# Patient Record
Sex: FEMALE | Race: WHITE | NOT HISPANIC OR LATINO | URBAN - METROPOLITAN AREA
[De-identification: names, ages, dates, MRNs, and addresses within clinical notes are randomized per-mention and may not be internally consistent; named-entity substitution may affect disease eponyms.]

---

## 2017-11-09 ENCOUNTER — APPOINTMENT (RX ONLY)
Dept: URBAN - METROPOLITAN AREA CLINIC 23 | Facility: CLINIC | Age: 37
Setting detail: DERMATOLOGY
End: 2017-11-09

## 2017-11-09 DIAGNOSIS — L30.8 OTHER SPECIFIED DERMATITIS: ICD-10-CM

## 2017-11-09 DIAGNOSIS — L23.9 ALLERGIC CONTACT DERMATITIS, UNSPECIFIED CAUSE: ICD-10-CM

## 2017-11-09 DIAGNOSIS — K13.0 DISEASES OF LIPS: ICD-10-CM

## 2017-11-09 PROCEDURE — ? COUNSELING

## 2017-11-09 PROCEDURE — ? PRESCRIPTION

## 2017-11-09 PROCEDURE — ? TREATMENT REGIMEN

## 2017-11-09 PROCEDURE — ? PATIENT SPECIFIC COUNSELING

## 2017-11-09 PROCEDURE — 99202 OFFICE O/P NEW SF 15 MIN: CPT

## 2017-11-09 RX ORDER — KETOCONAZOLE 20 MG/G
CREAM TOPICAL BID
Qty: 1 | Refills: 2 | Status: ERX | COMMUNITY
Start: 2017-11-09

## 2017-11-09 RX ORDER — HYDROCORTISONE 25 MG/G
CREAM TOPICAL
Qty: 1 | Refills: 1 | Status: ERX | COMMUNITY
Start: 2017-11-09

## 2017-11-09 RX ORDER — FLUOCINONIDE 0.5 MG/G
CREAM TOPICAL
Qty: 1 | Refills: 2 | Status: ERX | COMMUNITY
Start: 2017-11-09

## 2017-11-09 RX ADMIN — KETOCONAZOLE: 20 CREAM TOPICAL at 13:23

## 2017-11-09 RX ADMIN — FLUOCINONIDE: 0.5 CREAM TOPICAL at 13:19

## 2017-11-09 RX ADMIN — HYDROCORTISONE: 25 CREAM TOPICAL at 13:22

## 2017-11-09 ASSESSMENT — LOCATION DETAILED DESCRIPTION DERM
LOCATION DETAILED: RIGHT AXILLARY VAULT
LOCATION DETAILED: LEFT THENAR EMINENCE
LOCATION DETAILED: RIGHT RADIAL PALM
LOCATION DETAILED: LEFT RADIAL PALM
LOCATION DETAILED: RIGHT ULNAR PALM
LOCATION DETAILED: LEFT AXILLARY VAULT

## 2017-11-09 ASSESSMENT — LOCATION SIMPLE DESCRIPTION DERM
LOCATION SIMPLE: LEFT AXILLARY VAULT
LOCATION SIMPLE: RIGHT AXILLARY VAULT
LOCATION SIMPLE: RIGHT HAND
LOCATION SIMPLE: LEFT HAND

## 2017-11-09 ASSESSMENT — LOCATION ZONE DERM
LOCATION ZONE: AXILLAE
LOCATION ZONE: HAND

## 2017-11-09 NOTE — PROCEDURE: TREATMENT REGIMEN
Discontinue Regimen: Desonide cream
Detail Level: Zone
Plan: With every hand washing apply a thin layer of vanicream moisturizer.
Otc Regimen: Vanicream deodorant and vanicream cleansers
Initiate Treatment: Hydrocortisone cream bid x 2 weeks, PRN
Initiate Treatment: Lidex cream
Samples Given: Vanicream moisturizer

## 2017-11-09 NOTE — PROCEDURE: PATIENT SPECIFIC COUNSELING
Detail Level: Zone
Would recommend to avoid hand  as much as possible. Discussed of not improved with regime will consider a referral to [facility]erg[facility] for patch testing.

## 2017-11-09 NOTE — HPI: RASH
How Severe Is Your Rash?: moderate
Is This A New Presentation, Or A Follow-Up?: Rash
Additional History: States that she started a new natural deodorant  and had believed it's the cause of hand rash.

## 2018-09-25 PROBLEM — Z88.9 MULTIPLE ALLERGIES: Status: ACTIVE | Noted: 2018-09-25

## 2018-09-25 PROBLEM — N92.6 MENSTRUAL CHANGES: Status: ACTIVE | Noted: 2018-09-25

## 2018-09-25 PROBLEM — R00.2 PALPITATIONS: Status: ACTIVE | Noted: 2018-09-25

## 2018-09-25 PROBLEM — R23.9 UNSPECIFIED SKIN CHANGES: Status: ACTIVE | Noted: 2018-09-25

## 2018-09-25 PROBLEM — Z00.00 WELL ADULT HEALTH CHECK: Status: ACTIVE | Noted: 2018-09-25

## 2019-09-23 PROBLEM — Z00.00 WELL ADULT HEALTH CHECK: Status: RESOLVED | Noted: 2018-09-25 | Resolved: 2019-09-23

## 2019-12-19 PROBLEM — J06.9 ACUTE URI: Status: ACTIVE | Noted: 2019-12-19

## 2019-12-19 PROBLEM — Z20.828 EXPOSURE TO THE FLU: Status: ACTIVE | Noted: 2019-12-19

## 2020-04-06 PROBLEM — Z78.9 DIETING: Status: ACTIVE | Noted: 2020-04-06

## 2020-04-06 PROBLEM — R41.840 ATTENTION DEFICIT: Status: ACTIVE | Noted: 2020-04-06

## 2020-05-07 PROBLEM — N30.00 ACUTE CYSTITIS WITHOUT HEMATURIA: Status: ACTIVE | Noted: 2020-05-07

## 2021-10-11 ENCOUNTER — TRANSCRIBE ORDER (OUTPATIENT)
Dept: REGISTRATION | Age: 41
End: 2021-10-11

## 2021-10-11 DIAGNOSIS — Z12.31 VISIT FOR SCREENING MAMMOGRAM: Primary | ICD-10-CM

## 2021-11-20 ENCOUNTER — HOSPITAL ENCOUNTER (OUTPATIENT)
Dept: MAMMOGRAPHY | Age: 41
Discharge: HOME | End: 2021-11-20
Attending: OBSTETRICS & GYNECOLOGY
Payer: OTHER GOVERNMENT

## 2021-11-20 DIAGNOSIS — Z12.31 VISIT FOR SCREENING MAMMOGRAM: ICD-10-CM

## 2021-11-20 PROCEDURE — 77067 SCR MAMMO BI INCL CAD: CPT

## 2022-03-18 PROBLEM — J06.9 ACUTE URI: Status: ACTIVE | Noted: 2019-12-19

## 2022-03-19 PROBLEM — R41.840 ATTENTION DEFICIT: Status: ACTIVE | Noted: 2020-04-06

## 2022-03-19 PROBLEM — Z88.9 MULTIPLE ALLERGIES: Status: ACTIVE | Noted: 2018-09-25

## 2022-03-19 PROBLEM — Z20.828 EXPOSURE TO THE FLU: Status: ACTIVE | Noted: 2019-12-19

## 2022-03-19 PROBLEM — Z78.9 DIETING: Status: ACTIVE | Noted: 2020-04-06

## 2022-03-19 PROBLEM — N30.00 ACUTE CYSTITIS WITHOUT HEMATURIA: Status: ACTIVE | Noted: 2020-05-07

## 2022-03-20 PROBLEM — N92.6 MENSTRUAL CHANGES: Status: ACTIVE | Noted: 2018-09-25

## 2022-03-20 PROBLEM — R23.9 UNSPECIFIED SKIN CHANGES: Status: ACTIVE | Noted: 2018-09-25

## 2022-03-20 PROBLEM — R00.2 PALPITATIONS: Status: ACTIVE | Noted: 2018-09-25
